# Patient Record
(demographics unavailable — no encounter records)

---

## 2025-02-07 NOTE — PHYSICAL EXAM
[Well Developed] : well developed [Well Nourished] : well nourished [Normal S1, S2] : normal S1, S2 [No Murmur] : no murmur [Clear Lung Fields] : clear lung fields [Soft] : abdomen soft [Non Tender] : non-tender [No Edema] : no edema [No Rash] : no rash [Moves all extremities] : moves all extremities [Alert and Oriented] : alert and oriented

## 2025-02-07 NOTE — HISTORY OF PRESENT ILLNESS
[FreeTextEntry1] : Ms. Pitt is a 60yo F with no significant PMHx with recent hospital admission (05/272022 at [facility]) for ST vs SVT presents for follow up. Her PMD is Dr. Hanna Rouse. Patient had palpitations in the past which were related to a stressful event and subsided on their own. This time, she had palpitations for 1 hour, which prompted her visit to [facility]. She was found to have narrow complexes ST vs SVT with RVR of 211-220. Patient was transferred to the hospital, converted to NSR after an injection of Adenosine by EMS. During hospital course patient was diagnosed with HTN and hyperlipidemia, was started on Losartan and atorvastatin. Troponin on admission 0.02-0.06-0.03. Nuclear stress test showed no evidence for ischemia. Normal LV function on TTE. -Patient is feeling well. Feels everything has "leveled out". BP checks at home at goal. Denies medication side effects.  04/20/23-Patient feeling well. Denies new complaints. BP still controlled at home.  10/16/23-Patient is feeling well. She denies any new symptoms. She also has been keeping BP log at home with good BP control.  02/05/24-Patient had sinusitis in December. Took Tylenol sinus once. Otherwise BP at home has been controlled.  08/19/24-Patient feeling well. BP well controlled at home. Some elevated BP when on vacation on [place] and also with sinus meds.

## 2025-02-07 NOTE — REASON FOR VISIT
[Arrhythmia/ECG Abnorrmalities] : arrhythmia/ECG abnormalities [Hyperlipidemia] : hyperlipidemia [Hypertension] : hypertension [Other: ____] : [unfilled] [FreeTextEntry1] : Diagnostic Tests: --------------------- [id number]. Normal EKG.  22-Sinus bradycardia at 57 bpm. TWF.  22-Sinus tachycardia. Nonspecific ST changes.  --------------------- Echo: 24-TTE: EF 61%.  22-TTE: EF 55%. Mild LVH.  --------------------- Stress: 22-Exercise MPI: Exercise 3 min. MPHR 100%. METS 4.6. Hypertensive response. Horizontal STD. No evidence for ischemia, EF 79%.

## 2025-02-07 NOTE — ASSESSMENT
[FreeTextEntry1] : Palpitations: With pSVT - continue Metoprolol ER 50 gm PO daily - follow up with EP - Patient to defer ablation for now.  White-Coat Hypertension: BP at goal per ACC/AHA 2018 guidelines - patient is hypertensive at the time of the visit. Likely white coat hypertension. BP home log with -130s. - Continue with losartan 100mg PO daily, Metoprolol 50 mg PO - monitor BP 3-4 times a week BID and keep a log - low sodium diet -BP correlates with home cuff. Likely white-coat hypertension.   Hyperlipidemia: , HDL 56, TG 95, LDL 52. LFTs normal (09/24/22)->, TG 77, HDL 52, LDL 59 (10/23) -Continue with Atorvastatin 40mg PO daily. - discussed therapeutic lifestyle modifications. Patient is allergic to raw vegetables.  Prediabetes: HA1c 5.7% (10/23) -Continue with lifestyle changes.  -Pt will try to cut back on chips and other snacks.   F/U in 6 months. -TTE.